# Patient Record
Sex: MALE | Race: WHITE | NOT HISPANIC OR LATINO | Employment: FULL TIME | URBAN - NONMETROPOLITAN AREA
[De-identification: names, ages, dates, MRNs, and addresses within clinical notes are randomized per-mention and may not be internally consistent; named-entity substitution may affect disease eponyms.]

---

## 2018-01-10 ENCOUNTER — APPOINTMENT (OUTPATIENT)
Dept: GENERAL RADIOLOGY | Facility: HOSPITAL | Age: 53
End: 2018-01-10

## 2018-01-10 ENCOUNTER — HOSPITAL ENCOUNTER (EMERGENCY)
Facility: HOSPITAL | Age: 53
Discharge: HOME | End: 2018-01-10
Attending: EMERGENCY MEDICINE | Admitting: EMERGENCY MEDICINE

## 2018-01-10 VITALS
HEIGHT: 71 IN | BODY MASS INDEX: 27.58 KG/M2 | TEMPERATURE: 97.9 F | DIASTOLIC BLOOD PRESSURE: 80 MMHG | OXYGEN SATURATION: 97 % | SYSTOLIC BLOOD PRESSURE: 136 MMHG | WEIGHT: 197 LBS | RESPIRATION RATE: 20 BRPM | HEART RATE: 71 BPM

## 2018-01-10 DIAGNOSIS — S82.832A CLOSED FRACTURE OF DISTAL END OF LEFT FIBULA, UNSPECIFIED FRACTURE MORPHOLOGY, INITIAL ENCOUNTER: Primary | ICD-10-CM

## 2018-01-10 DIAGNOSIS — S93.422A TEAR OF DELTOID LIGAMENT OF LEFT ANKLE, INITIAL ENCOUNTER: ICD-10-CM

## 2018-01-10 PROCEDURE — 73630 X-RAY EXAM OF FOOT: CPT

## 2018-01-10 PROCEDURE — 99283 EMERGENCY DEPT VISIT LOW MDM: CPT

## 2018-01-10 PROCEDURE — 73610 X-RAY EXAM OF ANKLE: CPT

## 2018-01-10 PROCEDURE — 73590 X-RAY EXAM OF LOWER LEG: CPT

## 2018-01-10 RX ORDER — HYDROCODONE BITARTRATE AND ACETAMINOPHEN 5; 325 MG/1; MG/1
2 TABLET ORAL EVERY 6 HOURS PRN
Qty: 15 TABLET | Refills: 0 | Status: SHIPPED | OUTPATIENT
Start: 2018-01-10 | End: 2018-01-15 | Stop reason: SDUPTHER

## 2018-01-10 RX ORDER — HYDROCODONE BITARTRATE AND ACETAMINOPHEN 7.5; 325 MG/1; MG/1
1 TABLET ORAL ONCE
Status: COMPLETED | OUTPATIENT
Start: 2018-01-10 | End: 2018-01-10

## 2018-01-10 RX ADMIN — HYDROCODONE BITARTRATE AND ACETAMINOPHEN 1 TABLET: 7.5; 325 TABLET ORAL at 19:44

## 2018-01-10 NOTE — ED NOTES
"Pt is presented to Ed with c/o left leg pain after falling while stepping off a step.  Pt states \"I heard a pop\".     Gladys Grace RN  01/10/18 [id number]    "

## 2018-01-11 ENCOUNTER — TELEPHONE (OUTPATIENT)
Dept: FAMILY MEDICINE CLINIC | Facility: CLINIC | Age: 53
End: 2018-01-11

## 2018-01-11 NOTE — DISCHARGE INSTRUCTIONS
Return ED pain, swelling, worse condition, other concerns    Hypertension  Hypertension, commonly called high blood pressure, is when the force of blood pumping through your arteries is too strong. Your arteries are the blood vessels that carry blood from your heart throughout your body. A blood pressure reading consists of a higher number over a lower number, such as 110/72. The higher number (systolic) is the pressure inside your arteries when your heart pumps. The lower number (diastolic) is the pressure inside your arteries when your heart relaxes. Ideally you want your blood pressure below 120/80.  Hypertension forces your heart to work harder to pump blood. Your arteries may become narrow or stiff. Having untreated or uncontrolled hypertension can cause heart attack, stroke, kidney disease, and other problems.  RISK FACTORS  Some risk factors for high blood pressure are controllable. Others are not.   Risk factors you cannot control include:   · Race. You may be at higher risk if you are .  · Age. Risk increases with age.  · Gender. Men are at higher risk than women before age 45 years. After age 65, women are at higher risk than men.  Risk factors you can control include:  · Not getting enough exercise or physical activity.  · Being overweight.  · Getting too much fat, sugar, calories, or salt in your diet.  · Drinking too much alcohol.  SIGNS AND SYMPTOMS  Hypertension does not usually cause signs or symptoms. Extremely high blood pressure (hypertensive crisis) may cause headache, anxiety, shortness of breath, and nosebleed.  DIAGNOSIS  To check if you have hypertension, your health care provider will measure your blood pressure while you are seated, with your arm held at the level of your heart. It should be measured at least twice using the same arm. Certain conditions can cause a difference in blood pressure between your right and left arms. A blood pressure reading that is higher than  normal on one occasion does not mean that you need treatment. If it is not clear whether you have high blood pressure, you may be asked to return on a different day to have your blood pressure checked again. Or, you may be asked to monitor your blood pressure at home for 1 or more weeks.  TREATMENT  Treating high blood pressure includes making lifestyle changes and possibly taking medicine. Living a healthy lifestyle can help lower high blood pressure. You may need to change some of your habits.  Lifestyle changes may include:  · Following the DASH diet. This diet is high in fruits, vegetables, and whole grains. It is low in salt, red meat, and added sugars.  · Keep your sodium intake below 2,300 mg per day.  · Getting at least 30-45 minutes of aerobic exercise at least 4 times per week.  · Losing weight if necessary.  · Not smoking.  · Limiting alcoholic beverages.  · Learning ways to reduce stress.  Your health care provider may prescribe medicine if lifestyle changes are not enough to get your blood pressure under control, and if one of the following is true:  · You are 18-59 years of age and your systolic blood pressure is above 140.  · You are 60 years of age or older, and your systolic blood pressure is above 150.  · Your diastolic blood pressure is above 90.  · You have diabetes, and your systolic blood pressure is over 140 or your diastolic blood pressure is over 90.  · You have kidney disease and your blood pressure is above 140/90.  · You have heart disease and your blood pressure is above 140/90.  Your personal target blood pressure may vary depending on your medical conditions, your age, and other factors.  HOME CARE INSTRUCTIONS  · Have your blood pressure rechecked as directed by your health care provider.    · Take medicines only as directed by your health care provider. Follow the directions carefully. Blood pressure medicines must be taken as prescribed. The medicine does not work as well when you  skip doses. Skipping doses also puts you at risk for problems.  · Do not smoke.    · Monitor your blood pressure at home as directed by your health care provider.   SEEK MEDICAL CARE IF:   · You think you are having a reaction to medicines taken.  · You have recurrent headaches or feel dizzy.  · You have swelling in your ankles.  · You have trouble with your vision.  SEEK IMMEDIATE MEDICAL CARE IF:  · You develop a severe headache or confusion.  · You have unusual weakness, numbness, or feel faint.  · You have severe chest or abdominal pain.  · You vomit repeatedly.  · You have trouble breathing.  MAKE SURE YOU:   · Understand these instructions.  · Will watch your condition.  · Will get help right away if you are not doing well or get worse.     This information is not intended to replace advice given to you by your health care provider. Make sure you discuss any questions you have with your health care provider.     Document Released: 12/18/2006 Document Revised: 05/03/2016 Document Reviewed: 10/10/2014  [employer] Patient Education ©2017 [employer]

## 2018-01-11 NOTE — ED PROVIDER NOTES
Subjective   HPI Comments: 51yo male presents ED s/p hyperinversion injury left ankle with subsequent pain/swelling/inability to bear wt left ankle.  ROS otherwise noncontributory.    Patient is a 52 y.o. male presenting with lower extremity pain.   Lower Extremity Issue   Location:  Ankle  Time since incident:  1 hour  Injury: yes    Mechanism of injury: fall    Fall:     Fall occurred:  Walking    Impact surface:  [place]    Entrapped after fall: no    Ankle location:  L ankle      Review of Systems   Constitutional: Negative.    HENT: Negative.    Eyes: Negative.    Respiratory: Negative.    Cardiovascular: Negative.    Gastrointestinal: Negative.    Musculoskeletal: Positive for joint swelling.       History reviewed. No pertinent past medical history.    No Known Allergies    History reviewed. No pertinent surgical history.    No family history on file.    Social History     Social History   • Marital status:      Spouse name: N/A   • Number of children: N/A   • Years of education: N/A     Social History Main Topics   • Smoking status: None   • Smokeless tobacco: None   • Alcohol use None   • Drug use: None   • Sexual activity: Not Asked     Other Topics Concern   • None     Social History Narrative   • None           Objective   Physical Exam   Constitutional: He is oriented to person, place, and time. He appears well-developed and well-nourished.   HENT:   Head: Normocephalic and atraumatic.   Eyes: Pupils are equal, round, and reactive to light.   Neck: Neck supple. No JVD present. No tracheal deviation present.   Cardiovascular: Normal rate, regular rhythm, normal heart sounds and intact distal pulses.  Exam reveals no gallop and no friction rub.    No murmur heard.  Pulmonary/Chest: Effort normal and breath sounds normal. He has no wheezes. He has no rales.   Abdominal: Soft. Bowel sounds are normal. There is no rebound and no guarding.   Musculoskeletal:        Left ankle: He exhibits decreased  range of motion and swelling. He exhibits no ecchymosis, no deformity, no laceration and normal pulse. Tenderness. Lateral malleolus, medial malleolus and AITFL tenderness found. No CF ligament, no posterior TFL, no head of 5th metatarsal and no proximal fibula tenderness found. Achilles tendon normal.        Lymphadenopathy:     He has no cervical adenopathy.   Neurological: He is oriented to person, place, and time.   Skin: Skin is warm and dry.   Nursing note and vitals reviewed.      Procedures         ED Course  ED Course   Comment By Time   Dr. Aviles paged Otis Ocasio MD 01/10 1937   Ekasper#[id number] reviewed Otis Ocasio MD 01/10 1939   D/w Dr. Aviles. Will f/u outpatient. Otis Ocasio MD 01/10 1944      Xr Tibia Fibula 2 View Left    Result Date: 1/10/2018  Narrative: EXAM:  Radiograph(s), Osseous       REGION:   Leg  SIDE:     Left   VIEWS:   2         INDICATION:    fall   COMPARISON:    Multiple concurrently obtained exams.    FINDINGS:       Fracture of the distal fibula as previously described. No other evidence of osseous injury. .        Impression: CONCLUSION:       1. Distal fibular fracture as previously described.                   Electronically signed by:  GERSON Aggarwal MD  1/10/2018 7:18 PM CST Workstation: [id number]    Xr Ankle 3+ View Left    Result Date: 1/10/2018  Narrative: EXAM:  Radiograph(s), Osseous       REGION:   Ankle  SIDE:     Left   VIEWS:   4         INDICATION:    fall   COMPARISON:    none          FINDINGS:       Oblique minimally displaced fracture involving the fibula. Suspect mild ankle mortise widening medially. No other fracture visualized. .        Impression: CONCLUSION:       1. Oblique fracture of the distal fibula. 2. Suspect ankle mortise instability.          Note:  if pain or symptoms persist beyond reasonable expectations and follow-up imaging is anticipated,  cross sectional imaging (CT and/or MRI) is suggested, as is deemed clinically appropriate.             Electronically signed by:  GERSON Aggarwal MD  1/10/2018 7:15 PM CST Workstation: [id number]    Xr Foot 3+ View Left    Result Date: 1/10/2018  Narrative: EXAM:  Radiograph(s), Osseous       REGION:   Foot  SIDE:     Left   VIEWS:   2         INDICATION:    fall   COMPARISON:    none          FINDINGS:       No evidence of a fracture or bony malalignment.   No evidence of osteolytic/osteoblastic disease.   Mild age-related degenerative changes.                         .        Impression: CONCLUSION:       1. No gross evidence of acute osseous pathology.          Note:  if pain or symptoms persist beyond reasonable expectations and follow-up imaging is anticipated,  cross sectional imaging (CT and/or MRI) is suggested, as is deemed clinically appropriate.            Electronically signed by:  GERSON Aggarwal MD  1/10/2018 7:12 PM [facility] Workstation: [id number]                [facility]    Final diagnoses:   Closed fracture of distal end of left fibula, unspecified fracture morphology, initial encounter   Tear of deltoid ligament of left ankle, initial encounter            Otis Ocasio MD  01/10/18 1950

## 2018-01-15 ENCOUNTER — OFFICE VISIT (OUTPATIENT)
Dept: ORTHOPEDIC SURGERY | Facility: CLINIC | Age: 53
End: 2018-01-15

## 2018-01-15 VITALS — WEIGHT: 192 LBS | HEIGHT: 69 IN | BODY MASS INDEX: 28.44 KG/M2

## 2018-01-15 DIAGNOSIS — S82.822A CLOSED TORUS FRACTURE OF DISTAL END OF LEFT FIBULA, INITIAL ENCOUNTER: ICD-10-CM

## 2018-01-15 DIAGNOSIS — M25.572 ACUTE LEFT ANKLE PAIN: Primary | ICD-10-CM

## 2018-01-15 DIAGNOSIS — S82.842A CLOSED BIMALLEOLAR FRACTURE OF LEFT ANKLE, INITIAL ENCOUNTER: ICD-10-CM

## 2018-01-15 DIAGNOSIS — S93.422A TEAR OF DELTOID LIGAMENT OF ANKLE, LEFT, INITIAL ENCOUNTER: ICD-10-CM

## 2018-01-15 PROBLEM — S82.832A CLOSED FRACTURE OF DISTAL END OF LEFT FIBULA: Status: ACTIVE | Noted: 2018-01-15

## 2018-01-15 PROCEDURE — 99204 OFFICE O/P NEW MOD 45 MIN: CPT | Performed by: ORTHOPAEDIC SURGERY

## 2018-01-15 RX ORDER — HYDROCODONE BITARTRATE AND ACETAMINOPHEN 5; 325 MG/1; MG/1
2 TABLET ORAL EVERY 6 HOURS PRN
Qty: 50 TABLET | Refills: 0 | Status: SHIPPED | OUTPATIENT
Start: 2018-01-15 | End: 2021-07-12

## 2018-01-15 NOTE — PROGRESS NOTES
Rodriguez Mckeon is a 52 y.o. male   Primary provider:  No Known Provider       Chief Complaint   Patient presents with   • Left Ankle - Consult       HISTORY OF PRESENT ILLNESS: Patient is here for consult- LT ankle injury. Date of injury- 1/10/2018. Patient states that injury occurred after falling on concrete. Patient states that the pain began immediately. Patient states that the pain is worse upon weightbearing. Patient states that following the fall swelling to the left foot/ankle began immediately. Patient was transported to [facility] where they obtained xray's, placed foot in aircast boot & prescribed narcotic pain medication for pain control.     History of Present Illness   Injured 1/10/18, slipped and fell at home, twisting his ankle, was unable to bear weight, significant swelling was seen in the ED last week for evaluation.       CONCURRENT MEDICAL HISTORY:    History reviewed. No pertinent past medical history.    No Known Allergies      Current Outpatient Prescriptions:   •  HYDROcodone-acetaminophen (NORCO) 5-325 MG per tablet, Take 2 tablets by mouth Every 6 (Six) Hours As Needed for Moderate Pain ., Disp: 50 tablet, Rfl: 0    History reviewed. No pertinent surgical history.    History reviewed. No pertinent family history.    Social History     Social History   • Marital status:      Spouse name: N/A   • Number of children: N/A   • Years of education: N/A     Occupational History   • Not on file.     Social History Main Topics   • Smoking status: Never Smoker   • Smokeless tobacco: Never Used   • Alcohol use No   • Drug use: No   • Sexual activity: Defer     Other Topics Concern   • Not on file     Social History Narrative        Review of Systems   Constitutional: Positive for activity change. Negative for appetite change, chills, diaphoresis, fatigue, fever and unexpected weight change.   HENT: Negative.  Negative for congestion, dental problem, facial swelling, hearing loss,  "nosebleeds, postnasal drip, trouble swallowing and voice change.    Eyes: Negative.  Negative for pain, discharge, redness and itching.   Respiratory: Negative.  Negative for cough, choking, chest tightness, shortness of breath, wheezing and stridor.    Cardiovascular: Negative.  Negative for chest pain, palpitations and leg swelling.   Gastrointestinal: Negative.  Negative for abdominal pain, blood in stool, constipation, diarrhea and nausea.   Endocrine: Negative.  Negative for cold intolerance and heat intolerance.   Genitourinary: Negative.  Negative for dysuria, frequency, hematuria and urgency.   Musculoskeletal: Positive for joint swelling. Negative for gait problem, neck pain and neck stiffness.   Skin: Negative.  Negative for pallor and rash.   Allergic/Immunologic: Negative.    Neurological: Negative.  Negative for syncope, facial asymmetry, speech difficulty, weakness, numbness and headaches.   Hematological: Negative.    Psychiatric/Behavioral: Negative.  Negative for agitation, behavioral problems, confusion, decreased concentration, dysphoric mood and hallucinations. The patient is not nervous/anxious and is not hyperactive.        PHYSICAL EXAMINATION:       Ht 175.3 cm (69\")  Wt 87.1 kg (192 lb)  BMI 28.35 kg/m2    Physical Exam   Constitutional: He appears well-developed.   HENT:   Head: Normocephalic and atraumatic.   Eyes: Pupils are equal, round, and reactive to light. Right eye exhibits no discharge. Left eye exhibits no discharge.   Neck: Normal range of motion. No JVD present. No tracheal deviation present. No thyromegaly present.   Cardiovascular: Normal rate, regular rhythm, normal heart sounds and intact distal pulses.  Exam reveals no gallop and no friction rub.    No murmur heard.  Pulmonary/Chest: Effort normal and breath sounds normal. No respiratory distress. He has no wheezes. He has no rales. He exhibits no tenderness.   Abdominal: Soft. Bowel sounds are normal. He exhibits no " distension. There is no tenderness. There is no guarding.   Musculoskeletal: He exhibits no edema, tenderness or deformity.   Lymphadenopathy:     He has no cervical adenopathy.   Neurological: He is alert. He has normal reflexes. He displays normal reflexes. No cranial nerve deficit. Coordination normal.   Skin: Skin is warm. No rash noted. No erythema.   Psychiatric: He has a normal mood and affect. His behavior is normal. Thought content normal.       GAIT:     []  Normal  [x]  Antalgic    Assistive device: []  None  []  Walker     [x]  Crutches  []  Cane     []  Wheelchair  []  Stretcher    Left Ankle Exam   Swelling: moderate    Tenderness   The patient is experiencing tenderness in the deltoid and lateral malleolus.     Muscle Strength   Dorsiflexion:  4/5   Plantar flexion:  4/5   Anterior tibial:  4/5   Posterior tibial:  4/5    Other   Erythema: absent  Sensation: normal  Pulse: present                  Xr Tibia Fibula 2 View Left    Result Date: 1/10/2018  Narrative: EXAM:  Radiograph(s), Osseous       REGION:   Leg  SIDE:     Left   VIEWS:   2         INDICATION:    fall   COMPARISON:    Multiple concurrently obtained exams.    FINDINGS:       Fracture of the distal fibula as previously described. No other evidence of osseous injury. .        Impression: CONCLUSION:       1. Distal fibular fracture as previously described.                   Electronically signed by:  GERSON Aggarwal MD  1/10/2018 7:18 PM CST Workstation: [id number]    Xr Ankle 3+ View Left    Result Date: 1/10/2018  Narrative: EXAM:  Radiograph(s), Osseous       REGION:   Ankle  SIDE:     Left   VIEWS:   4         INDICATION:    fall   COMPARISON:    none          FINDINGS:       Oblique minimally displaced fracture involving the fibula. Suspect mild ankle mortise widening medially. No other fracture visualized. .        Impression: CONCLUSION:       1. Oblique fracture of the distal fibula. 2. Suspect ankle mortise instability.           Note:  if pain or symptoms persist beyond reasonable expectations and follow-up imaging is anticipated,  cross sectional imaging (CT and/or MRI) is suggested, as is deemed clinically appropriate.            Electronically signed by:  GERSON Aggarwal MD  1/10/2018 7:15 PM CST Workstation: [id number]    Xr Foot 3+ View Left    Result Date: 1/10/2018  Narrative: EXAM:  Radiograph(s), Osseous       REGION:   Foot  SIDE:     Left   VIEWS:   2         INDICATION:    fall   COMPARISON:    none          FINDINGS:       No evidence of a fracture or bony malalignment.   No evidence of osteolytic/osteoblastic disease.   Mild age-related degenerative changes.                         .        Impression: CONCLUSION:       1. No gross evidence of acute osseous pathology.          Note:  if pain or symptoms persist beyond reasonable expectations and follow-up imaging is anticipated,  cross sectional imaging (CT and/or MRI) is suggested, as is deemed clinically appropriate.            Electronically signed by:  GERSON Aggarwal MD  1/10/2018 7:12 PM CST Workstation: [id number]    I reviewed xray of left ankle 1/10/18, displaced bimalleolar ankle fracture      ASSESSMENT:    Diagnoses and all orders for this visit:    Acute left ankle pain    Closed torus fracture of distal end of left fibula, initial encounter    Tear of deltoid ligament of ankle, left, initial encounter    Closed bimalleolar fracture of left ankle, initial encounter    Other orders  -     HYDROcodone-acetaminophen (NORCO) 5-325 MG per tablet; Take 2 tablets by mouth Every 6 (Six) Hours As Needed for Moderate Pain .          PLAN    Recommend surgical fixation for outcomes, discussed non surgical treatment, outcomes, surgical technique, risks which include infection, hematoma, non union, malunion, worsening pain, revision surgery and post traumatic arthritis.  I reviewed with him ankle function would be poor with non surgical treatment.  I made clear to him that I  recommend surgical treatment.  I answered his questions. No guarantees are given to him today.          Candido Aviles MD

## 2019-02-26 ENCOUNTER — HOSPITAL ENCOUNTER (EMERGENCY)
Facility: HOSPITAL | Age: 54
Discharge: HOME | End: 2019-02-26
Attending: EMERGENCY MEDICINE | Admitting: EMERGENCY MEDICINE

## 2019-02-26 VITALS
TEMPERATURE: 97.4 F | SYSTOLIC BLOOD PRESSURE: 141 MMHG | RESPIRATION RATE: 20 BRPM | BODY MASS INDEX: 27.2 KG/M2 | WEIGHT: 190 LBS | HEART RATE: 72 BPM | HEIGHT: 70 IN | OXYGEN SATURATION: 95 % | DIASTOLIC BLOOD PRESSURE: 83 MMHG

## 2019-02-26 DIAGNOSIS — S39.012A LOW BACK STRAIN, INITIAL ENCOUNTER: Primary | ICD-10-CM

## 2019-02-26 PROCEDURE — 96372 THER/PROPH/DIAG INJ SC/IM: CPT

## 2019-02-26 PROCEDURE — 99284 EMERGENCY DEPT VISIT MOD MDM: CPT

## 2019-02-26 PROCEDURE — 25010000002 KETOROLAC TROMETHAMINE PER 15 MG: Performed by: EMERGENCY MEDICINE

## 2019-02-26 PROCEDURE — 25010000002 HYDROMORPHONE PER 4 MG: Performed by: EMERGENCY MEDICINE

## 2019-02-26 RX ORDER — OXYCODONE HYDROCHLORIDE AND ACETAMINOPHEN 5; 325 MG/1; MG/1
1 TABLET ORAL ONCE
Status: COMPLETED | OUTPATIENT
Start: 2019-02-26 | End: 2019-02-26

## 2019-02-26 RX ORDER — IBUPROFEN 800 MG/1
800 TABLET ORAL EVERY 8 HOURS PRN
Qty: 21 TABLET | Refills: 0 | Status: SHIPPED | OUTPATIENT
Start: 2019-02-26 | End: 2021-07-12

## 2019-02-26 RX ORDER — CYCLOBENZAPRINE HCL 10 MG
10 TABLET ORAL ONCE
Status: COMPLETED | OUTPATIENT
Start: 2019-02-26 | End: 2019-02-26

## 2019-02-26 RX ORDER — HYDROCODONE BITARTRATE AND ACETAMINOPHEN 5; 325 MG/1; MG/1
1 TABLET ORAL EVERY 6 HOURS PRN
Qty: 15 TABLET | Refills: 0 | Status: SHIPPED | OUTPATIENT
Start: 2019-02-26 | End: 2021-07-12

## 2019-02-26 RX ORDER — ONDANSETRON 4 MG/1
4 TABLET, ORALLY DISINTEGRATING ORAL ONCE
Status: COMPLETED | OUTPATIENT
Start: 2019-02-26 | End: 2019-02-26

## 2019-02-26 RX ORDER — CYCLOBENZAPRINE HCL 10 MG
10 TABLET ORAL 3 TIMES DAILY PRN
Qty: 15 TABLET | Refills: 0 | Status: SHIPPED | OUTPATIENT
Start: 2019-02-26 | End: 2021-07-12

## 2019-02-26 RX ORDER — KETOROLAC TROMETHAMINE 30 MG/ML
30 INJECTION, SOLUTION INTRAMUSCULAR; INTRAVENOUS ONCE
Status: COMPLETED | OUTPATIENT
Start: 2019-02-26 | End: 2019-02-26

## 2019-02-26 RX ORDER — HYDROMORPHONE HCL 110MG/55ML
1 PATIENT CONTROLLED ANALGESIA SYRINGE INTRAVENOUS ONCE
Status: COMPLETED | OUTPATIENT
Start: 2019-02-26 | End: 2019-02-26

## 2019-02-26 RX ADMIN — OXYCODONE HYDROCHLORIDE AND ACETAMINOPHEN 1 TABLET: 5; 325 TABLET ORAL at 12:53

## 2019-02-26 RX ADMIN — HYDROMORPHONE HYDROCHLORIDE 1 MG: 2 INJECTION INTRAMUSCULAR; INTRAVENOUS; SUBCUTANEOUS at 12:05

## 2019-02-26 RX ADMIN — CYCLOBENZAPRINE HYDROCHLORIDE 10 MG: 10 TABLET, FILM COATED ORAL at 12:52

## 2019-02-26 RX ADMIN — ONDANSETRON 4 MG: 4 TABLET, ORALLY DISINTEGRATING ORAL at 12:05

## 2019-02-26 RX ADMIN — KETOROLAC TROMETHAMINE 30 MG: 30 INJECTION, SOLUTION INTRAMUSCULAR; INTRAVENOUS at 12:08

## 2021-07-12 PROBLEM — F41.8 SITUATIONAL ANXIETY: Status: ACTIVE | Noted: 2018-01-31

## 2021-07-12 PROCEDURE — 87635 SARS-COV-2 COVID-19 AMP PRB: CPT | Performed by: NURSE PRACTITIONER
